# Patient Record
Sex: FEMALE
[De-identification: names, ages, dates, MRNs, and addresses within clinical notes are randomized per-mention and may not be internally consistent; named-entity substitution may affect disease eponyms.]

---

## 2019-04-30 ENCOUNTER — APPOINTMENT (OUTPATIENT)
Dept: ORTHOPEDIC SURGERY | Facility: CLINIC | Age: 39
End: 2019-04-30

## 2019-05-28 ENCOUNTER — APPOINTMENT (OUTPATIENT)
Dept: ORTHOPEDIC SURGERY | Facility: CLINIC | Age: 39
End: 2019-05-28
Payer: COMMERCIAL

## 2019-05-28 VITALS — BODY MASS INDEX: 31.01 KG/M2 | HEIGHT: 63 IN | WEIGHT: 175 LBS

## 2019-05-28 PROCEDURE — 99204 OFFICE O/P NEW MOD 45 MIN: CPT | Mod: 25

## 2019-05-28 PROCEDURE — 73562 X-RAY EXAM OF KNEE 3: CPT | Mod: 50

## 2019-05-28 PROCEDURE — 20611 DRAIN/INJ JOINT/BURSA W/US: CPT | Mod: 50

## 2019-05-28 NOTE — DISCUSSION/SUMMARY
[de-identified] : We talked about her options we will order Orthovisc injections patient will be notified once Available

## 2019-05-28 NOTE — PHYSICAL EXAM
[de-identified] : Right knee has lateral joint line tenderness range of motion is 0-125° there is crepitus or soft tissue swelling warmth but no effusion.\par \par Left knee also has range of motion of 0-125°. There is evidence no soft tissue swelling minimal warmth. Both knees exhibit lateral joint line tenderness. [de-identified] : AP standing individual lateral and sunrise views are obtained showing moderate diminishment of the lateral joint space of both knees. Radiographically more advanced on the right. Her secondary findings of osteophyte formation.

## 2019-05-28 NOTE — HISTORY OF PRESENT ILLNESS
[de-identified] : LAST SEEN IN DECEMBER - VICENTE KNEE PAIN - GIVEN NEW XRAYS TODAYPatient complains more of left knee pain she status post right knee arthroscopy by another physician she is had cortisone injections in the past as well as Orthovisc injections which have helped. She currently takes Toradol.

## 2019-05-28 NOTE — PROCEDURE
[de-identified] : Patient was given bilateral cortisone injections that is done under sterile conditions an ultrasound guidance patient tolerated the procedure well.

## 2019-05-30 ENCOUNTER — APPOINTMENT (OUTPATIENT)
Dept: ORTHOPEDIC SURGERY | Facility: CLINIC | Age: 39
End: 2019-05-30

## 2019-06-10 ENCOUNTER — RESULT REVIEW (OUTPATIENT)
Age: 39
End: 2019-06-10

## 2019-12-03 ENCOUNTER — APPOINTMENT (OUTPATIENT)
Dept: ORTHOPEDIC SURGERY | Facility: CLINIC | Age: 39
End: 2019-12-03
Payer: COMMERCIAL

## 2019-12-03 VITALS — HEIGHT: 63 IN | WEIGHT: 175 LBS | BODY MASS INDEX: 31.01 KG/M2

## 2019-12-03 PROCEDURE — 20611 DRAIN/INJ JOINT/BURSA W/US: CPT | Mod: 50

## 2019-12-03 PROCEDURE — 99214 OFFICE O/P EST MOD 30 MIN: CPT | Mod: 25

## 2019-12-03 RX ORDER — HYALURONATE SODIUM 30 MG/2 ML
30 SYRINGE (ML) INTRAARTICULAR
Qty: 3 | Refills: 0 | Status: ACTIVE | OUTPATIENT
Start: 2019-12-03

## 2019-12-03 NOTE — DISCUSSION/SUMMARY
[de-identified] : We will order Orthovisc injections for both knees. The patient will be contacted once they are available for use.

## 2019-12-03 NOTE — PROCEDURE
[de-identified] : Patient was given bilateral cortisone injections that is done under sterile conditions an ultrasound guidance patient tolerated the procedure well.

## 2019-12-03 NOTE — PHYSICAL EXAM
[de-identified] : Right knee has lateral joint line tenderness range of motion is 0-125° there is crepitus or soft tissue swelling warmth but no effusion.\par \par Left knee also has range of motion of 0-125°. There is evidence no soft tissue swelling minimal warmth. Both knees exhibit lateral joint line tenderness.

## 2019-12-04 ENCOUNTER — OTHER (OUTPATIENT)
Age: 39
End: 2019-12-04

## 2019-12-04 RX ORDER — HYALURONATE SODIUM 30 MG/2 ML
30 SYRINGE (ML) INTRAARTICULAR
Qty: 6 | Refills: 0 | Status: ACTIVE | OUTPATIENT
Start: 2019-12-04

## 2020-03-24 ENCOUNTER — APPOINTMENT (OUTPATIENT)
Dept: ORTHOPEDIC SURGERY | Facility: CLINIC | Age: 40
End: 2020-03-24

## 2020-08-11 NOTE — HISTORY OF PRESENT ILLNESS
[de-identified] : CORTISONE INJECTION VICENTE KNEES Patient has done well in the past with cortisone injections he also had Orthovisc injection by another physician with some modest improvement. Recall she has fairly advanced osteoarthritis in the lateral compartment of each knee. No

## 2021-05-07 ENCOUNTER — APPOINTMENT (OUTPATIENT)
Dept: ORTHOPEDIC SURGERY | Facility: CLINIC | Age: 41
End: 2021-05-07
Payer: COMMERCIAL

## 2021-05-07 PROCEDURE — 73562 X-RAY EXAM OF KNEE 3: CPT | Mod: 50

## 2021-05-07 PROCEDURE — 99214 OFFICE O/P EST MOD 30 MIN: CPT | Mod: 25

## 2021-05-07 PROCEDURE — 99072 ADDL SUPL MATRL&STAF TM PHE: CPT

## 2021-05-07 PROCEDURE — 20611 DRAIN/INJ JOINT/BURSA W/US: CPT | Mod: 50

## 2021-05-07 RX ORDER — CELECOXIB 200 MG/1
200 CAPSULE ORAL DAILY
Qty: 30 | Refills: 0 | Status: ACTIVE | COMMUNITY
Start: 2021-05-07 | End: 1900-01-01

## 2021-05-07 NOTE — PROCEDURE
[de-identified] : Patient was given bilateral cortisone injections today. His sterile conditions an ultrasound guidance. Patient tolerated the procedure well

## 2021-05-07 NOTE — HISTORY OF PRESENT ILLNESS
[de-identified] : Old patient returns today she has developed discomfort in her knees again. This was seen previously given cortisone injections we attempted to get Orthovisc injections but they're not approved by her insurance and

## 2021-05-07 NOTE — REASON FOR VISIT
[Follow-Up Visit] : a follow-up visit for [FreeTextEntry2] : VICENTE KNEE PAIN - SENT FOR NEW XRAYS

## 2021-05-07 NOTE — DISCUSSION/SUMMARY
[Medication Risks Reviewed] : Medication risks reviewed [de-identified] : We will order Orthovisc injection scheduled the presence of 100 mg p.o. b.i.d. for 6 day  course and be taken thereafter as needed.

## 2021-05-07 NOTE — PHYSICAL EXAM
[de-identified] : Right knee has lateral joint line tenderness range of motion is 0-125° there is crepitus or soft tissue swelling warmth but no effusion.\par \par Left knee also has range of motion of 0-125°. There is evidence no soft tissue swelling minimal warmth. Both knees exhibit lateral joint line tenderness. [de-identified] : Radiographs were repeated today showing fairly advanced lateral compartment arthritis in both knees. Patellofemoral and medial compartment fairly well-maintained

## 2023-02-24 ENCOUNTER — APPOINTMENT (OUTPATIENT)
Dept: ORTHOPEDIC SURGERY | Facility: CLINIC | Age: 43
End: 2023-02-24
Payer: COMMERCIAL

## 2023-02-24 VITALS — HEIGHT: 62 IN | BODY MASS INDEX: 33.86 KG/M2 | WEIGHT: 184 LBS

## 2023-02-24 DIAGNOSIS — M25.552 PAIN IN LEFT HIP: ICD-10-CM

## 2023-02-24 PROCEDURE — 73521 X-RAY EXAM HIPS BI 2 VIEWS: CPT

## 2023-02-24 PROCEDURE — 20611 DRAIN/INJ JOINT/BURSA W/US: CPT | Mod: 50

## 2023-02-24 PROCEDURE — 73562 X-RAY EXAM OF KNEE 3: CPT | Mod: 50

## 2023-02-24 PROCEDURE — 99215 OFFICE O/P EST HI 40 MIN: CPT | Mod: 25

## 2023-02-24 RX ORDER — HYALURONATE SODIUM, STABILIZED 88 MG/4 ML
88 SYRINGE (ML) INTRAARTICULAR
Qty: 2 | Refills: 0 | Status: ACTIVE | OUTPATIENT
Start: 2023-02-24

## 2023-02-24 RX ORDER — MELOXICAM 15 MG/1
15 TABLET ORAL
Qty: 30 | Refills: 2 | Status: ACTIVE | COMMUNITY
Start: 2023-02-24 | End: 1900-01-01

## 2023-02-24 NOTE — HISTORY OF PRESENT ILLNESS
[de-identified] : This is a well-known patient presents today with complaint of chronic bilateral knee pain.  She been treated in the past both cortisone injections also complaining of left hip pain which is new onset.  Patient states she has an occasional catching and locking sensation which is quite positional with the left hip and seems to be worse when she is getting out of seated position.\par \par In the past patient been treated well with cortisone injections and HA injections for her knees.  Both  have provided her sustained symptomatic improvement in the past.

## 2023-02-24 NOTE — DISCUSSION/SUMMARY
[de-identified] : As far as the left hip is concerned patient has a clinical exam history as well as radiographs all consistent with possible diagnosis of labral tear.  Because of the duration of her symptoms and the severity of her symptoms patient was sent for an MRI to help evaluate for the potential.\par \par As far as the knee exam is concerned patient has an established diagnosis confirmed today again by radiographs of severe osteoarthritis of both knees.  In addition to the cortisone injection supplied today we will order the patient's bilateral HA single-dose applications.  She will be notified once they are available for use.  We will talked at length about the need to consider knee replacement surgery in the future if patient does not see sustained symptomatic improvement with conservative measures.\par \par Patient I also discussed at length the need to consider the use of Ozempic to help reduce her weight decrease her BMI to help reduce the stress on her knees and hopefully reduce her symptoms.  Weight reduction and BMI reduction will also help to reduce any perioperative complications and may have a positive influence on implant longevity.\par \par Today's consultation lasted 50 minutes

## 2023-02-24 NOTE — PHYSICAL EXAM
[de-identified] : Right knee has lateral joint line tenderness range of motion is 0-125° there is crepitus or soft tissue swelling warmth but no effusion.\par \par Left knee also has range of motion of 0-125°. There is evidence no soft tissue swelling minimal warmth. Both knees exhibit lateral joint line tenderness.\par \par Left hip has full passive internal/external rotation at 90 degrees with no mechanical block pain or restriction. [de-identified] : Hip radiographs were ordered today AP and lateral both hips were obtained showing well-preserved joint spaces no evidence of cam impingement morphology on the lateral projection of the left hip.\par \par Knee x-rays were ordered today AP standing individual and sunrise views were obtained showing significant tricompartmental osteoarthritis with no significant interval change from previous radiographs.

## 2023-02-24 NOTE — PROCEDURE
[de-identified] : LIDOCAINE\par HIKMA FARMACEUTICA\par NDC 6628-2376-23\par LOT #[id number]\par EXP 01/2024\par 1% 500MG/50ML\par \par KENALOG-10\par [employer]\par NDC 6974-8285-40\par LOT #[id number]\par EXP aug 2024\par 50MG/5ML\par \par Patient was given a cortisone injection (Lidocaine 1% 4 cc + 10 mg of Kenalog) in the lateral compartment of the right and left knee. Injection is performed under sterile conditions with ultrasound guidance. Patient tolerated procedure well. If patient has a history of insulin- dependant diabetes they were informed of possible increase of blood glucose levels and instructed to adjust insulin accordingly.\par \par  [FreeTextEntry1] : \par

## 2023-02-24 NOTE — REASON FOR VISIT
[Follow-Up Visit] : a follow-up visit for [Hip Pain] : hip pain [Knee Pain] : knee pain [FreeTextEntry2] : VICENTE knee pain. Lt hip pain. Pt had X-rays done at [facility]. Left hip pain > 6 weeks ago, seen another Doctor about this complaint, failed conservative treatment, rest & physical therapy , provider guided for > 6 weeks within the last 3 months, also taking meloxicam prescribed by another provider.

## 2023-10-03 ENCOUNTER — NON-APPOINTMENT (OUTPATIENT)
Age: 43
End: 2023-10-03

## 2023-10-03 ENCOUNTER — APPOINTMENT (OUTPATIENT)
Dept: ORTHOPEDIC SURGERY | Facility: CLINIC | Age: 43
End: 2023-10-03
Payer: COMMERCIAL

## 2023-10-03 DIAGNOSIS — M54.16 RADICULOPATHY, LUMBAR REGION: ICD-10-CM

## 2023-10-03 DIAGNOSIS — M17.0 BILATERAL PRIMARY OSTEOARTHRITIS OF KNEE: ICD-10-CM

## 2023-10-03 PROCEDURE — 99215 OFFICE O/P EST HI 40 MIN: CPT | Mod: 25

## 2023-10-03 PROCEDURE — 20610 DRAIN/INJ JOINT/BURSA W/O US: CPT | Mod: 50

## 2023-10-03 RX ORDER — HYALURONATE SODIUM, STABILIZED 88 MG/4 ML
88 SYRINGE (ML) INTRAARTICULAR
Qty: 2 | Refills: 0 | Status: ACTIVE | OUTPATIENT
Start: 2023-10-03

## 2023-10-03 RX ORDER — METHYLPREDNISOLONE 4 MG/1
4 TABLET ORAL
Qty: 1 | Refills: 1 | Status: ACTIVE | COMMUNITY
Start: 2023-10-03 | End: 1900-01-01

## 2023-10-05 ENCOUNTER — NON-APPOINTMENT (OUTPATIENT)
Age: 43
End: 2023-10-05